# Patient Record
Sex: MALE | Race: WHITE | NOT HISPANIC OR LATINO | Employment: FULL TIME | URBAN - NONMETROPOLITAN AREA
[De-identification: names, ages, dates, MRNs, and addresses within clinical notes are randomized per-mention and may not be internally consistent; named-entity substitution may affect disease eponyms.]

---

## 2018-05-15 ENCOUNTER — LAB (OUTPATIENT)
Dept: LAB | Facility: HOSPITAL | Age: 28
End: 2018-05-15

## 2018-05-15 ENCOUNTER — OFFICE VISIT (OUTPATIENT)
Dept: FAMILY MEDICINE CLINIC | Facility: CLINIC | Age: 28
End: 2018-05-15

## 2018-05-15 VITALS
WEIGHT: 185 LBS | HEIGHT: 70 IN | HEART RATE: 79 BPM | OXYGEN SATURATION: 98 % | BODY MASS INDEX: 26.48 KG/M2 | SYSTOLIC BLOOD PRESSURE: 110 MMHG | DIASTOLIC BLOOD PRESSURE: 78 MMHG

## 2018-05-15 DIAGNOSIS — Z11.3 ROUTINE SCREENING FOR STI (SEXUALLY TRANSMITTED INFECTION): ICD-10-CM

## 2018-05-15 DIAGNOSIS — Z13.1 SCREENING FOR DIABETES MELLITUS (DM): ICD-10-CM

## 2018-05-15 DIAGNOSIS — Z76.89 ENCOUNTER TO ESTABLISH CARE WITH NEW DOCTOR: Primary | ICD-10-CM

## 2018-05-15 DIAGNOSIS — Z13.220 SCREENING FOR CHOLESTEROL LEVEL: ICD-10-CM

## 2018-05-15 LAB
ARTICHOKE IGE QN: 82 MG/DL (ref 1–129)
BILIRUB UR QL STRIP: NEGATIVE
CHOLEST SERPL-MCNC: 169 MG/DL (ref 0–199)
CLARITY UR: CLEAR
COLOR UR: YELLOW
GLUCOSE UR STRIP-MCNC: NEGATIVE MG/DL
HBA1C MFR BLD: 4.6 % (ref 4–5.6)
HDLC SERPL-MCNC: 51 MG/DL (ref 60–200)
HGB UR QL STRIP.AUTO: NEGATIVE
KETONES UR QL STRIP: NEGATIVE
LDLC/HDLC SERPL: 1.08 {RATIO} (ref 0–3.55)
LEUKOCYTE ESTERASE UR QL STRIP.AUTO: NEGATIVE
NITRITE UR QL STRIP: NEGATIVE
PH UR STRIP.AUTO: 7 [PH] (ref 5–9)
PROT UR QL STRIP: NEGATIVE
SP GR UR STRIP: 1.02 (ref 1–1.03)
TRIGL SERPL-MCNC: 315 MG/DL (ref 20–199)
UROBILINOGEN UR QL STRIP: NORMAL

## 2018-05-15 PROCEDURE — 81003 URINALYSIS AUTO W/O SCOPE: CPT

## 2018-05-15 PROCEDURE — G0432 EIA HIV-1/HIV-2 SCREEN: HCPCS

## 2018-05-15 PROCEDURE — 80061 LIPID PANEL: CPT

## 2018-05-15 PROCEDURE — 99203 OFFICE O/P NEW LOW 30 MIN: CPT | Performed by: FAMILY MEDICINE

## 2018-05-15 PROCEDURE — 83036 HEMOGLOBIN GLYCOSYLATED A1C: CPT

## 2018-05-15 PROCEDURE — 36415 COLL VENOUS BLD VENIPUNCTURE: CPT

## 2018-05-15 PROCEDURE — 86592 SYPHILIS TEST NON-TREP QUAL: CPT

## 2018-05-15 PROCEDURE — 87491 CHLMYD TRACH DNA AMP PROBE: CPT

## 2018-05-15 PROCEDURE — 87591 N.GONORRHOEAE DNA AMP PROB: CPT

## 2018-05-15 PROCEDURE — 87661 TRICHOMONAS VAGINALIS AMPLIF: CPT

## 2018-05-15 RX ORDER — TRIAMCINOLONE ACETONIDE 55 UG/1
2 SPRAY, METERED NASAL DAILY
COMMUNITY

## 2018-05-16 LAB
C TRACH RRNA CVX QL NAA+PROBE: NEGATIVE
HIV1+2 AB SER QL: NEGATIVE
N GONORRHOEA RRNA SPEC QL NAA+PROBE: NEGATIVE
T VAGINALIS DNA VAG QL PROBE+SIG AMP: NORMAL

## 2018-05-17 NOTE — PROGRESS NOTES
Subjective:     Chief Complaint:   Chief Complaint   Patient presents with   • [facility]       Barak Velasquez is a 27 y.o. male who presents for [facility] visit. He is establishing today for preventative services. He states that overall, he is in a good state of health, and currently reports no health issues. He states that he does have allergic rhinitis at times, which he does take OTC medications to help with symptoms. He is wanting to be screened for STIs. He states that he has been sexually active with two people, and states that he has used protection most of the time but there was times where he did not use protection, and wanted to make sure he was clean. In the past several weeks, he denies any penile discharge, scrotal pain, scrotal swelling. No fevers, chills, no enlargement of inguinal lymph nodes. No burning with urination.     Past Medical Hx:  No past medical history on file.    Past Surgical Hx:  No past surgical history on file.    Health Maintenance:  Health Maintenance   Topic Date Due   • ANNUAL PHYSICAL  05/29/1991   • TDAP/TD VACCINES (1 - Tdap) 05/29/2007   • INFLUENZA VACCINE  08/01/2018       Current Meds:    Current Outpatient Prescriptions:   •  Triamcinolone Acetonide (NASACORT) 55 MCG/ACT nasal inhaler, 2 sprays into each nostril Daily., Disp: , Rfl:     Allergies:  Patient has no known allergies.    Family Hx:  No family history on file.     Social History:  Social History     Social History   • Marital status: Single     Spouse name: N/A   • Number of children: N/A   • Years of education: N/A     Occupational History   • Not on file.     Social History Main Topics   • Smoking status: Never Smoker   • Smokeless tobacco: Never Used   • Alcohol use Not on file   • Drug use: Unknown   • Sexual activity: Not on file     Other Topics Concern   • Not on file     Social History Narrative   • No narrative on file       Review of Systems  Review of Systems   Constitutional:  "Negative for appetite change, chills and fever.   HENT: Negative for congestion, ear pain, rhinorrhea, sneezing and sore throat.    Respiratory: Negative for cough and shortness of breath.    Cardiovascular: Negative for chest pain, palpitations and leg swelling.   Gastrointestinal: Negative for diarrhea, nausea and vomiting.   Endocrine: Negative for polyphagia and polyuria.   Musculoskeletal: Negative for back pain and neck pain.   Skin: Negative for color change and rash.   Neurological: Negative for dizziness, syncope and weakness.   Psychiatric/Behavioral: Negative for agitation, confusion and dysphoric mood.       Objective:     /78 (BP Location: Right arm, Cuff Size: Adult)   Pulse 79   Ht 177.8 cm (70\")   Wt 83.9 kg (185 lb)   SpO2 98%   BMI 26.54 kg/m²     Physical Exam   Constitutional: He is oriented to person, place, and time. He appears well-developed and well-nourished.   Cardiovascular: Normal rate, regular rhythm and normal heart sounds.  Exam reveals no gallop and no friction rub.    No murmur heard.  Pulmonary/Chest: Effort normal and breath sounds normal. No respiratory distress. He has no wheezes. He has no rales.   Abdominal: Soft. Bowel sounds are normal. There is no tenderness.   Musculoskeletal: Normal range of motion. He exhibits no edema.   Neurological: He is alert and oriented to person, place, and time.   Skin: Skin is warm and dry.   Psychiatric: He has a normal mood and affect. His behavior is normal.   Vitals reviewed.         Assessment/Plan:     Barak was seen today for establish care.    Diagnoses and all orders for this visit:    Encounter to establish care with new doctor    Routine screening for STI (sexually transmitted infection)  -     HIV-1/O/2 Ag/Ab w Reflex; Future  -     RPR; Future  -     Urinalysis With / Culture If Indicated - Urine, Clean Catch; Future  -     Chlamydia trachomatis, Neisseria gonorrhoeae, PCR - Urine, Urine, Clean Catch; Future    Screening " for diabetes mellitus (DM)  -     Hemoglobin A1c; Future    Screening for cholesterol level  -     Lipid panel; Future        Return in about 1 year (around 5/15/2019) for Annual.    GOALS:  Improve diet and exercise   BARRIERS TO GOALS:  Work may interfere with goals.      Preventative:  Male Preventative: Exercises regularly  up to date and documented   Recommended:none  Tobacco: non smoker  Alcohol: does not drink  Diet/Exercise: eat more fruits and vegetables, decrease soda or juice intake, increase water intake, increase physical activity, reduce screen time, reduce portion size, cut out extra servings, reduce fast food intake, family to eat at dinner table more often, keep TV off during meals, plan meals, eat breakfast and have 3 meals a day    RISK SCORE: 3      This document has been electronically signed by Michael Velazquez MD on May 17, 2018 1:58 PM

## 2018-05-18 LAB — RPR SER QL: NORMAL

## 2019-05-07 ENCOUNTER — OFFICE VISIT (OUTPATIENT)
Dept: FAMILY MEDICINE CLINIC | Facility: CLINIC | Age: 29
End: 2019-05-07

## 2019-05-07 VITALS
WEIGHT: 198 LBS | HEIGHT: 70 IN | SYSTOLIC BLOOD PRESSURE: 128 MMHG | BODY MASS INDEX: 28.35 KG/M2 | HEART RATE: 93 BPM | OXYGEN SATURATION: 98 % | DIASTOLIC BLOOD PRESSURE: 80 MMHG

## 2019-05-07 DIAGNOSIS — Z00.00 ANNUAL PHYSICAL EXAM: Primary | ICD-10-CM

## 2019-05-07 PROCEDURE — 99395 PREV VISIT EST AGE 18-39: CPT | Performed by: FAMILY MEDICINE

## 2019-05-07 RX ORDER — FEXOFENADINE HCL AND PSEUDOEPHEDRINE HCI 180; 240 MG/1; MG/1
1 TABLET, EXTENDED RELEASE ORAL DAILY
COMMUNITY

## 2019-05-07 NOTE — PROGRESS NOTES
Subjective:     Chief Complaint:   Chief Complaint   Patient presents with   • Annual Exam       Barak Velasquez is a 28 y.o. male who presents for annual physical examination. He reports that he has been in a good state of health in the past years. Has not had any hospitalizations in the past year. He has had a biometric screening with his job which had normal labs. He reports that he had an elevated blood pressure reading at that time. He states that he was undergoing some stress at that time which he attributes could be a factor which brought his BP up. His blood pressure was checked in office today which resulted normal for his age range. He has a history of seasonal allergies, which he uses a nasal spray to help with symptoms. No acute issues currently with patient.     Past Medical Hx:  No past medical history on file.    Past Surgical Hx:  No past surgical history on file.    Health Maintenance:  Health Maintenance   Topic Date Due   • ANNUAL PHYSICAL  05/29/1991   • TDAP/TD VACCINES (1 - Tdap) 05/29/2007   • INFLUENZA VACCINE  08/01/2019       Current Meds:    Current Outpatient Medications:   •  fexofenadine-pseudoephedrine (ALLEGRA-D 24) 180-240 MG per 24 hr tablet, Take 1 tablet by mouth Daily., Disp: , Rfl:   •  Triamcinolone Acetonide (NASACORT) 55 MCG/ACT nasal inhaler, 2 sprays into each nostril Daily., Disp: , Rfl:     Allergies:  Patient has no known allergies.    Family Hx:  Family History   Problem Relation Age of Onset   • No Known Problems Mother    • No Known Problems Father         Social History:  Social History     Socioeconomic History   • Marital status: Single     Spouse name: Not on file   • Number of children: Not on file   • Years of education: Not on file   • Highest education level: Not on file   Tobacco Use   • Smoking status: Never Smoker   • Smokeless tobacco: Never Used       Review of Systems  Review of Systems   Constitutional: Negative for chills and fever.  "  Respiratory: Negative for shortness of breath.    Cardiovascular: Negative for chest pain.   Gastrointestinal: Negative for abdominal pain, diarrhea, nausea and vomiting.   Genitourinary: Negative for dysuria.   Neurological: Negative for dizziness.       Objective:     /80 (BP Location: Left arm)   Pulse 93   Ht 177.8 cm (70\")   Wt 89.8 kg (198 lb)   SpO2 98%   BMI 28.41 kg/m²     Physical Exam   Constitutional: He is oriented to person, place, and time. He appears well-developed and well-nourished.   HENT:   Head: Normocephalic and atraumatic.   Right Ear: External ear normal.   Left Ear: External ear normal.   Nose: Nose normal.   Mouth/Throat: Oropharynx is clear and moist.   Cardiovascular: Normal rate, regular rhythm and normal heart sounds. Exam reveals no gallop and no friction rub.   No murmur heard.  Pulmonary/Chest: Effort normal and breath sounds normal. No respiratory distress. He has no wheezes. He has no rales.   Abdominal: Soft. Bowel sounds are normal. There is no tenderness.   Musculoskeletal: Normal range of motion. He exhibits no edema.   Neurological: He is alert and oriented to person, place, and time.   Skin: Skin is warm and dry.   Psychiatric: He has a normal mood and affect. His behavior is normal.   Vitals reviewed.         Assessment/Plan:     Barak was seen today for annual exam.    Diagnoses and all orders for this visit:    Annual physical exam        Return in about 1 year (around 5/7/2020) for Annual.    GOALS:  Improve diet and exercise   BARRIERS TO GOALS:  Work may interfere with goals.      Preventative:  Male Preventative: Exercises regularly  up to date and documented   Recommended:none  Tobacco: non smoker  Alcohol: does not drink  Diet/Exercise: eat more fruits and vegetables, decrease soda or juice intake, increase water intake, increase physical activity, reduce screen time, reduce portion size, cut out extra servings, reduce fast food intake, family to eat at " dinner table more often, keep TV off during meals, plan meals, eat breakfast and have 3 meals a day    RISK SCORE: 3      This document has been electronically signed by Michael Velazquez MD on May 7, 2019 3:09 PM

## 2020-04-06 ENCOUNTER — TELEPHONE (OUTPATIENT)
Dept: ORTHOPEDIC SURGERY | Facility: CLINIC | Age: 30
End: 2020-04-06

## 2020-04-06 NOTE — TELEPHONE ENCOUNTER
YANG      Patient was seen at [facility] on Sunday April 5th. He was referred to us by Dr Oneill but we have not received the referral yet. He was told that on his lower right tibia there looked to be a hook he was told that was a fracture and to wear an ankle boot and follow up with you. Would you like to set up an appointment with this patient in office?

## 2020-04-06 NOTE — TELEPHONE ENCOUNTER
Ankle motion as tolerated  Ice and elevation  Use crutches for support  Set up for a video visit on Monday13th.

## 2020-04-10 ENCOUNTER — TRANSCRIBE ORDERS (OUTPATIENT)
Dept: ORTHOPEDIC SURGERY | Facility: CLINIC | Age: 30
End: 2020-04-10

## 2020-04-10 DIAGNOSIS — S82.891A CLOSED RIGHT ANKLE FRACTURE, INITIAL ENCOUNTER: Primary | ICD-10-CM

## 2020-04-13 ENCOUNTER — TELEMEDICINE (OUTPATIENT)
Dept: ORTHOPEDIC SURGERY | Facility: CLINIC | Age: 30
End: 2020-04-13
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
## 2020-04-13 DIAGNOSIS — S93.421A SPRAIN OF DELTOID LIGAMENT OF RIGHT ANKLE, INITIAL ENCOUNTER: Primary | ICD-10-CM
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
## 2020-04-13 PROCEDURE — 99203 OFFICE O/P NEW LOW 30 MIN: CPT | Performed by: ORTHOPAEDIC SURGERY

## 2020-04-13 RX ORDER — FEXOFENADINE HCL AND PSEUDOEPHEDRINE HCI 60; 120 MG/1; MG/1
1 TABLET, EXTENDED RELEASE ORAL
COMMUNITY

## 2020-04-13 RX ORDER — FLUTICASONE PROPIONATE 50 MCG
1 SPRAY, SUSPENSION (ML) NASAL DAILY
COMMUNITY

## 2020-04-13 NOTE — PROGRESS NOTES
Barak Velasquez is a 29 y.o. male   Primary provider:  Michael Velazquez MD       Chief Complaint   Patient presents with   • Ankle Pain     right       HISTORY OF PRESENT ILLNESS:  Patient is a 29-year-old white male who states that approximately 1 week ago he fell in his garage twisting his right ankle.  He had immediate pain in his right ankle and was seen at an urgent care for x-rays.  He was found to have a small chip fracture off of the medial malleolus and was placed into a walking boot.  Patient states that his pain has improved over the last week.  He continues to use the boot when he is out of the house and uses the boot some while he is in the house.  He has been walking some without the boot however.  He has mostly a dull ache but he has occasional sharp stabbing pains.  No numbness or tingling.  No other bony complaints.         CONCURRENT MEDICAL HISTORY:    History reviewed. No pertinent past medical history.    No Known Allergies      Current Outpatient Medications:   •  fexofenadine-pseudoephedrine (ALLEGRA-D 24) 180-240 MG per 24 hr tablet, Take 1 tablet by mouth Daily., Disp: , Rfl:   •  fexofenadine-pseudoephedrine (ALLEGRA-D)  MG per 12 hr tablet, Take 1 tablet by mouth., Disp: , Rfl:   •  fluticasone (FLONASE) 50 MCG/ACT nasal spray, 1 spray into the nostril(s) as directed by provider Daily., Disp: , Rfl:   •  Triamcinolone Acetonide (NASACORT) 55 MCG/ACT nasal inhaler, 2 sprays into each nostril Daily., Disp: , Rfl:     History reviewed. No pertinent surgical history.    Family History   Problem Relation Age of Onset   • No Known Problems Mother    • No Known Problems Father        Social History     Socioeconomic History   • Marital status: Single     Spouse name: Not on file   • Number of children: Not on file   • Years of education: Not on file   • Highest education level: Not on file   Tobacco Use   • Smoking status: Never Smoker   • Smokeless tobacco: Never Used         Review of Systems   Constitutional: Negative for chills and fever.   Respiratory: Negative.    Cardiovascular: Negative.    Gastrointestinal: Negative.    Genitourinary: Negative.    Musculoskeletal:        Right ankle pain   All other systems reviewed and are negative.      PHYSICAL EXAMINATION:       There were no vitals taken for this visit. - done as a video visit    Physical Exam   Constitutional: He is oriented to person, place, and time. He appears well-developed and well-nourished.   Eyes: Pupils are equal, round, and reactive to light. Conjunctivae are normal.   Neurological: He is alert and oriented to person, place, and time.   Psychiatric: He has a normal mood and affect. His behavior is normal. Judgment and thought content normal.       GAIT:     []  Normal  [x]  Antalgic    Assistive device: []  None  []  Walker     [x]  Crutches  []  Cane     []  Wheelchair  []  Stretcher    Ortho Exam  Mild swelling in right ankle  He is able to wiggle his foot up and down somewhat.          X-ray ankle right complete (3+views)4/6/2020  [facility]  Result Impression      Avulsion fracture tip medial malleolus with joint effusion.    [id number]   Result Narrative   Indication:  Injured right ankle.    Right Ankle, Three Views:  The ankle is normally aligned.  The tip of the medial malleolus has been avulsed; there is very little swelling appreciated and the age of this is uncertain.  There is a smooth calcification projecting along the medial aspect   of the lateral malleolus that is probably an accessory ossicle.  The talar dome is smooth.  There is a joint effusion.           ASSESSMENT:    Diagnoses and all orders for this visit:    Sprain of deltoid ligament of right ankle, initial encounter    Fall at home, initial encounter    Other orders  -     fluticasone (FLONASE) 50 MCG/ACT nasal spray; 1 spray into the nostril(s) as directed by provider Daily.  -     fexofenadine-pseudoephedrine (ALLEGRA-D)   MG per 12 hr tablet; Take 1 tablet by mouth.          PLAN    Discussion was held with the patient in regards to further treatment options.  He has a small a avulsion off of the tip of the medial malleolus that is nondisplaced.  We discussed that essentially this represents a significant ankle sprain.  He is going to begin ankle range of motion exercises.  He will slowly progress range of motion as tolerated.  He is also going to start progressing weightbearing as pain allows.  He was encouraged to use the boot and slowly increase weight.  As his pain improves he could start to wean himself out of the boot and protected and isolated situations.  If he had pain he was to decrease weightbearing and or go back to using the boot for support.  He was going to continue with ice and elevation.  We discussed repeat evaluation in 2 to 3 weeks.  As long as he was improving we would revisit the telehealth option.  If his symptoms were worsening then he was going to convert that visit to an in person visit for repeat x-rays and repeat evaluation of his ankle.    This visit was performed using telehealth video visit.    Return in about 2 weeks (around 4/27/2020) for recheck.    Charles Brewer MD

## 2020-05-20 DIAGNOSIS — M25.571 RIGHT ANKLE PAIN, UNSPECIFIED CHRONICITY: Primary | ICD-10-CM

## 2020-05-21 ENCOUNTER — OFFICE VISIT (OUTPATIENT)
Dept: ORTHOPEDIC SURGERY | Facility: CLINIC | Age: 30
End: 2020-05-21

## 2020-05-21 VITALS — WEIGHT: 206 LBS | HEIGHT: 70 IN | BODY MASS INDEX: 29.49 KG/M2

## 2020-05-21 DIAGNOSIS — S93.421D SPRAIN OF DELTOID LIGAMENT OF RIGHT ANKLE, SUBSEQUENT ENCOUNTER: ICD-10-CM

## 2020-05-21 DIAGNOSIS — M25.571 RIGHT ANKLE PAIN, UNSPECIFIED CHRONICITY: Primary | ICD-10-CM

## 2020-05-21 PROBLEM — S93.421A SPRAIN OF DELTOID LIGAMENT OF RIGHT ANKLE: Status: ACTIVE | Noted: 2020-05-21

## 2020-05-21 PROCEDURE — 99213 OFFICE O/P EST LOW 20 MIN: CPT | Performed by: ORTHOPAEDIC SURGERY

## 2020-05-21 NOTE — PROGRESS NOTES
"Barak Velasquez is a 29 y.o. male returns for     Chief Complaint   Patient presents with   • Right Ankle - Follow-up, Pain       HISTORY OF PRESENT ILLNESS: f/u right ankle pain with repeat xrays.   Not wearing the fracture boot now, using high ankle boots.  No numbness or tingling  No fever or chills.  Slowly getting better but still with some stiffness.     CONCURRENT MEDICAL HISTORY:    The following portions of the patient's history were reviewed and updated as appropriate: allergies, current medications, past family history, past medical history, past social history, past surgical history and problem list.     ROS  No fevers or chills.  No chest pain or shortness of air.  No GI or  disturbances.    PHYSICAL EXAMINATION:       Ht 177.8 cm (70\")   Wt 93.4 kg (206 lb)   BMI 29.56 kg/m²     Physical Exam   Constitutional: He is oriented to person, place, and time. He appears well-developed and well-nourished.   Neurological: He is alert and oriented to person, place, and time.   Psychiatric: He has a normal mood and affect. His behavior is normal. Judgment and thought content normal.       GAIT:     []  Normal  [x]  Antalgic    Assistive device: [x]  None  []  Walker     []  Crutches  []  Cane     []  Wheelchair  []  Stretcher    Right Ankle Exam     Tenderness   The patient is experiencing tenderness in the ATF (mild tenderness over medial malleolus).  Swelling: none    Range of Motion   Dorsiflexion: normal   Plantar flexion: normal     Muscle Strength   Dorsiflexion:  5/5  Plantar flexion:  5/5    Tests   Anterior drawer: negative    Other   Erythema: absent  Sensation: normal  Pulse: present               Xr Ankle 3+ View Right    Result Date: 5/21/2020  Narrative: Ordering Provider:  Charles Brewer MD Ordering Diagnosis/Indication:  Right ankle pain, unspecified chronicity Procedure:  XR ANKLE 3+ VW RIGHT Exam Date:  5/21/20 COMPARISON:  Not applicable, no relevant images available. "     Impression:  3 views of the right ankle show acceptable position and alignment with no evidence of acute bony abnormality.  No fracture or dislocation is noted.  Ankle mortise maintains acceptable alignment on each view.  There is a question of a very small a avulsion on the tip of the medial malleolus that shows no displacement. Charles Brewer MD 5/21/20             ASSESSMENT:    Diagnoses and all orders for this visit:    Right ankle pain, unspecified chronicity  -     Ambulatory Referral to Physical Therapy Evaluate and treat    Sprain of deltoid ligament of right ankle, subsequent encounter  -     Ambulatory Referral to Physical Therapy Evaluate and treat          PLAN    PT   ROM/STR   Ankle stability exercises   Advance as tolerated   ASO if needed.   Modalities as needed.    Slowly progress as tolerated.    No restrictions.    F/u 1 month.    Return in about 4 weeks (around 6/18/2020) for recheck.    Charles Brewer MD

## 2020-06-25 ENCOUNTER — HOSPITAL ENCOUNTER (OUTPATIENT)
Dept: PHYSICAL THERAPY | Facility: HOSPITAL | Age: 30
Setting detail: THERAPIES SERIES
Discharge: HOME | End: 2020-06-25

## 2020-06-25 DIAGNOSIS — S93.421D SPRAIN OF DELTOID LIGAMENT OF RIGHT ANKLE, SUBSEQUENT ENCOUNTER: ICD-10-CM

## 2020-06-25 DIAGNOSIS — M25.571 RIGHT ANKLE PAIN, UNSPECIFIED CHRONICITY: Primary | ICD-10-CM

## 2020-06-25 PROCEDURE — 97161 PT EVAL LOW COMPLEX 20 MIN: CPT | Performed by: PHYSICAL THERAPIST

## 2020-06-25 NOTE — THERAPY EVALUATION
Outpatient Physical Therapy Ortho Initial Evaluation  [facility]     Patient Name: Barak Velasquez  : 1990  MRN: [id number]  Today's Date: 2020      Visit Date: 2020  Visit   Return to MD: BROOKE  Re-cert date: 2020  Patient Active Problem List   Diagnosis   • Right ankle pain   • Sprain of deltoid ligament of right ankle        Past Medical History:   Diagnosis Date   • Asthma         Past Surgical History:   Procedure Laterality Date   • KNEE ACL RECONSTRUCTION Left        Visit Dx:     ICD-10-CM ICD-9-CM   1. Right ankle pain, unspecified chronicity M25.571 719.47   2. Sprain of deltoid ligament of right ankle, subsequent encounter S93.421D V58.89     845.01     Medications (Admitted on 2020)     fexofenadine-pseudoephedrine (ALLEGRA-D 24) 180-240 MG per 24 hr tablet     fexofenadine-pseudoephedrine (ALLEGRA-D)  MG per 12 hr tablet     fluticasone (FLONASE) 50 MCG/ACT nasal spray     Triamcinolone Acetonide (NASACORT) 55 MCG/ACT nasal inhaler      Allergies: NKA        PT Ortho     Row Name 20 1305       Subjective Comments    Subjective Comments  31 yo male with acute ankle injury on 2020 at home stepping into garage and fell onto the floor, rolling the ankle at the time. R ankle went numb at the time. X-ray the following morning showing an avulsion fracture of the R medial malleolus. Aggravating factors: active R ankle DF, stepping down stairs, hopping, jogging,    -BS       Precautions and Contraindications    Precautions/Limitations  no known precautions/limitations  -BS       Subjective Pain    Able to rate subjective pain?  yes  -BS    Pre-Treatment Pain Level  1  -BS    Post-Treatment Pain Level  0  -BS       Posture/Observations    Posture/Observations Comments  functional testing: deep squat with minimal R ankle pain, B heel raises x 10 reps with only slight R ankle discomfort.   -BS       Special Tests/Palpation    Special Tests/Palpation   "Ankle/Foot  -BS       Ankle/Foot Special Tests    Anterior drawer (ATFL lesion)  Right:;Negative  -BS    Talar tilt test (instability)  Right:;Negative  -BS       General ROM    GENERAL ROM COMMENTS  AROM: R ankle-DF 3 PF 50 inv 38 aileen 10 L ankle   -BS       MMT (Manual Muscle Testing)    General MMT Comments  MMT: R LE 5/5 including 5/5 R ankle (all planes); L LE 5/5  -BS       Sensation    Light Touch  No apparent deficits  -BS       Balance Skills Training    SLS  30 sec on each LE without UE A, increased ankle instabilty on RLE  -BS       Gait/Stairs Assessment/Training    Comment (Gait/Stairs)  up/down 2-3 portable steps with single UE A with slight lateral ankle (R) pain descending 6\" steps  -BS      User Key  (r) = Recorded By, (t) = Taken By, (c) = Cosigned By    Initials Name Provider Type    Nikita Alexis, PT Physical Therapist                            PT OP Goals     Row Name 06/25/20 1300          PT Short Term Goals    STG Date to Achieve  07/16/20  -BS     STG 1  Pt independent with HEP  -BS     STG 1 Progress  New  -BS     STG 2  Resume running on treadmill with no R ankle pain/symptoms  -BS     STG 2 Progress  New  -BS     STG 3  Descend stairs with no R ankle pain  -BS     STG 3 Progress  New  -BS     STG 4  Improve R ankle DF AROM to >/=6 degrees  -BS     STG 4 Progress  New  -BS        Time Calculation    PT Goal Re-Cert Due Date  07/16/20  -BS       User Key  (r) = Recorded By, (t) = Taken By, (c) = Cosigned By    Initials Name Provider Type    Nikita Alexis, PT Physical Therapist          PT Assessment/Plan     Row Name 06/25/20 1300          PT Assessment    Functional Limitations  Performance in work activities;Performance in leisure activities  -BS     Impairments  Balance;Pain;Range of motion  -BS     Assessment Comments  Acute R ankle pain due to deltoid sprain  -BS     Please refer to paper survey for additional self-reported information  Yes  -BS     Rehab Potential  Excellent  " -BS     Patient/caregiver participated in establishment of treatment plan and goals  Yes  -BS     Patient would benefit from skilled therapy intervention  Yes  -BS        PT Plan    PT Frequency  1x/week;2x/week  -BS     Predicted Duration of Therapy Intervention (Therapy Eval)  3-4 sessions total  -BS     Planned CPT's?  PT EVAL LOW COMPLEXITY: 37079  -BS     Physical Therapy Interventions (Optional Details)  balance training;home exercise program;joint mobilization;manual therapy techniques;modalities;neuromuscular re-education;ROM (Range of Motion);stair training;strengthening;stretching  -BS     PT Plan Comments  f/u with ankle stabilization exercises, attempt light jogging on treadmill.  -BS       User Key  (r) = Recorded By, (t) = Taken By, (c) = Cosigned By    Initials Name Provider Type    Nikita Alexis, PT Physical Therapist            OP Exercises     Row Name 06/25/20 1307             Subjective Comments    Subjective Comments  31 yo male with acute ankle injury on 4/5/2020 at home stepping into garage and fell onto the floor, rolling the ankle at the time. R ankle went numb at the time. X-ray the following morning showing an avulsion fracture of the R medial malleolus. Aggravating factors: active R ankle DF, stepping down stairs, hopping, jogging,    -BS         Subjective Pain    Able to rate subjective pain?  yes  -BS      Pre-Treatment Pain Level  1  -BS      Post-Treatment Pain Level  0  -BS        User Key  (r) = Recorded By, (t) = Taken By, (c) = Cosigned By    Initials Name Provider Type    Nikita Alexis, PT Physical Therapist                        Outcome Measure Options: Lower Extremity Functional Scale (LEFS)  Lower Extremity Functional Index  Any of your usual work, housework or school activities: A little bit of difficulty  Your usual hobbies, recreational or sporting activities: Moderate difficulty  Getting into or out of the bath: No difficulty  Walking between rooms: No  difficulty  Putting on your shoes or socks: A little bit of difficulty  Squatting: A little bit of difficulty  Lifting an object, like a bag of groceries from the floor: No difficulty  Performing light activities around your home: No difficulty  Performing heavy activities around your home: A little bit of difficulty  Getting into or out of a car: No difficulty  Walking 2 blocks: A little bit of difficulty  Walking a mile: A little bit of difficulty  Going up or down 10 stairs (about 1 flight of stairs): A little bit of difficulty  Standing for 1 hour: A little bit of difficulty  Sitting for 1 hour: No difficulty  Running on even ground: A little bit of difficulty  Running on uneven ground: A little bit of difficulty  Making sharp turns while running fast: A little bit of difficulty  Hopping: A little bit of difficulty  Rolling over in bed: No difficulty  Total: 66      Time Calculation:     Start Time: 1305  Stop Time: 1345  Time Calculation (min): 40 min  Total Timed Code Minutes- PT: 40 minute(s)     Therapy Charges for Today     Code Description Service Date Service Provider Modifiers Qty    67714301385 HC PT EVAL LOW COMPLEXITY 3 6/25/2020 Nikita Desai, PT GP 1          PT G-Codes  Outcome Measure Options: Lower Extremity Functional Scale (LEFS)  Total: 66         Nikita Desai PT  6/25/2020

## 2020-07-14 ENCOUNTER — APPOINTMENT (OUTPATIENT)
Dept: PHYSICAL THERAPY | Facility: HOSPITAL | Age: 30
End: 2020-07-14

## 2020-07-16 ENCOUNTER — HOSPITAL ENCOUNTER (OUTPATIENT)
Dept: PHYSICAL THERAPY | Facility: HOSPITAL | Age: 30
Setting detail: THERAPIES SERIES
Discharge: HOME | End: 2020-07-16

## 2020-07-16 DIAGNOSIS — S93.421D SPRAIN OF DELTOID LIGAMENT OF RIGHT ANKLE, SUBSEQUENT ENCOUNTER: ICD-10-CM

## 2020-07-16 DIAGNOSIS — M25.571 RIGHT ANKLE PAIN, UNSPECIFIED CHRONICITY: Primary | ICD-10-CM

## 2020-07-16 PROCEDURE — 97110 THERAPEUTIC EXERCISES: CPT

## 2020-07-16 NOTE — THERAPY TREATMENT NOTE
"    Outpatient Physical Therapy Ortho Treatment Note  [facility]     Patient Name: Barak Velasquez  : 1990  MRN: [id number]  Today's Date: 2020      Visit Date: 2020     Subjective Improvement \"getting better\"  Visis 2/3  Visits approved 5  RTMD PRN  Recert Date 2020    R ankle sprain with avulsion fx on 2020    Visit Dx:    ICD-10-CM ICD-9-CM   1. Right ankle pain, unspecified chronicity M25.571 719.47   2. Sprain of deltoid ligament of right ankle, subsequent encounter S93.421D V58.89     845.01       Patient Active Problem List   Diagnosis   • Right ankle pain   • Sprain of deltoid ligament of right ankle        Past Medical History:   Diagnosis Date   • Asthma         Past Surgical History:   Procedure Laterality Date   • KNEE ACL RECONSTRUCTION Left        PT Ortho     Row Name 20 0800       Subjective Comments    Subjective Comments  patient reports right ankle pain with pushing off and activities that cause him to flex foot forward.  -CP       Precautions and Contraindications    Precautions  R ankle avulsion fx on 2020  -CP       Subjective Pain    Able to rate subjective pain?  yes  -CP    Pre-Treatment Pain Level  0  -CP    Post-Treatment Pain Level  1  -CP       Posture/Observations    Posture/Observations Comments  amb independent  -CP      User Key  (r) = Recorded By, (t) = Taken By, (c) = Cosigned By    Initials Name Provider Type    CP Dori Bo, PTA Physical Therapy Assistant                      PT Assessment/Plan     Row Name 20 1029          PT Assessment    Assessment Comments  Patient gave good effort.  He did have increase pain and weakness with attempted quick feet.  did have some LOB with BOSU.    -CP        PT Plan    PT Frequency  1x/week;2x/week  -CP     Predicted Duration of Therapy Intervention (Therapy Eval)  3-4 visits  -CP     PT Plan Comments  Cont with POC.  Step down 4\"  -CP       User Key  (r) = Recorded By, (t) = " Taken By, (c) = Cosigned By    Initials Name Provider Type    CP Dori Bo, PTA Physical Therapy Assistant            OP Exercises     Row Name 07/16/20 0800             Subjective Comments    Subjective Comments  patient reports right ankle pain with pushing off and activities that cause him to flex foot forward.  -CP         Subjective Pain    Able to rate subjective pain?  yes  -CP      Pre-Treatment Pain Level  0  -CP      Post-Treatment Pain Level  1  -CP         Exercise 1    Exercise Name 1  ProII level 3  -CP      Time 1  10  -CP         Exercise 2    Exercise Name 2  incline stretch  -CP      Cueing 2  Verbal  -CP      Sets 2  3  -CP      Time 2  30  -CP         Exercise 3    Exercise Name 3  Solous stretch  -CP      Cueing 3  Verbal  -CP      Sets 3  3  -CP      Time 3  30  -CP         Exercise 4    Exercise Name 4  up and down ramp in clinic  -CP      Cueing 4  Verbal  -CP      Reps 4  5  -CP      Time 4  backward  -CP         Exercise 5    Exercise Name 5  BOSU step up  -CP      Cueing 5  Verbal;Demo  -CP      Sets 5  2  -CP      Reps 5  10  -CP         Exercise 6    Exercise Name 6  BOSU lat step up  -CP      Cueing 6  Verbal;Demo  -CP      Sets 6  2  -CP      Reps 6  10  -CP         Exercise 7    Exercise Name 7  Mini squats  -CP      Cueing 7  Verbal;Demo  -CP      Reps 7  15  -CP         Exercise 8    Exercise Name 8  Split stance heel raises  -CP      Cueing 8  Verbal;Demo  -CP      Reps 8  2  -CP      Time 8  10  -CP      Additional Comments  right LE behing=d  -CP         Exercise 9    Exercise Name 9  Toe walk  -CP      Cueing 9  Verbal;Demo  -CP      Reps 9  15 ft  -CP         Exercise 10    Exercise Name 10  heel walking  -CP      Cueing 10  Verbal;Demo  -CP      Reps 10  15  -CP         Exercise 11    Exercise Name 11  defensively slides  -CP      Cueing 11  Verbal;Demo  -CP      Reps 11  20 ft   -CP      Time 11  bilateral  -CP         Exercise 12    Exercise Name 12  braiding  -CP       Cueing 12  Verbal;Demo  -CP      Time 12  20 ft   -CP      Additional Comments  bilateral  -CP        User Key  (r) = Recorded By, (t) = Taken By, (c) = Cosigned By    Initials Name Provider Type    CP Dori Bo PTA Physical Therapy Assistant                       PT OP Goals     Row Name 07/16/20 1000          PT Short Term Goals    STG Date to Achieve  07/16/20  -CP     STG 1  Pt independent with HEP  -CP     STG 1 Progress  Ongoing  -CP     STG 2  Resume running on treadmill with no R ankle pain/symptoms  -CP     STG 2 Progress  Not Met  -CP     STG 3  Descend stairs with no R ankle pain  -CP     STG 3 Progress  Not Met  -CP     STG 4  Improve R ankle DF AROM to >/=6 degrees  -CP     STG 4 Progress  Progressing  -CP        Time Calculation    PT Goal Re-Cert Due Date  07/16/20  -CP       User Key  (r) = Recorded By, (t) = Taken By, (c) = Cosigned By    Initials Name Provider Type    CP Dori Bo PTA Physical Therapy Assistant          Therapy Education  Education Details: mini squats, split stance heelraises, toe walking, heel walking  Given: HEP  Program: New  How Provided: Verbal, Demonstration  Provided to: Patient  Level of Understanding: Teach back education performed, Verbalized, Demonstrated              Time Calculation:   Start Time: 0848  Stop Time: 0930  Time Calculation (min): 42 min  Total Timed Code Minutes- PT: 42 minute(s)  Therapy Charges for Today     Code Description Service Date Service Provider Modifiers Qty    24290197131 HC PT THER PROC EA 15 MIN 7/16/2020 Dori Bo PTA GP 3                    Dori Bo PTA  7/16/2020

## 2020-07-20 ENCOUNTER — HOSPITAL ENCOUNTER (OUTPATIENT)
Dept: PHYSICAL THERAPY | Facility: HOSPITAL | Age: 30
Setting detail: THERAPIES SERIES
Discharge: HOME | End: 2020-07-20

## 2020-07-20 DIAGNOSIS — S93.421D SPRAIN OF DELTOID LIGAMENT OF RIGHT ANKLE, SUBSEQUENT ENCOUNTER: ICD-10-CM

## 2020-07-20 DIAGNOSIS — M25.571 RIGHT ANKLE PAIN, UNSPECIFIED CHRONICITY: Primary | ICD-10-CM

## 2020-07-20 PROCEDURE — 97112 NEUROMUSCULAR REEDUCATION: CPT | Performed by: PHYSICAL THERAPIST

## 2020-07-20 PROCEDURE — 97110 THERAPEUTIC EXERCISES: CPT | Performed by: PHYSICAL THERAPIST

## 2020-07-20 NOTE — THERAPY PROGRESS REPORT/RE-CERT
"    Outpatient Physical Therapy Ortho Progress Note  [facility]     Patient Name: Barak Velasquez  : 1990  MRN: [id number]  Today's Date: 2020      Visit Date: 2020  Subjective Improvement \"getting better\"  Visis 3/4  Visits approved 5  RTMD PRN  Recert Date 8-     R ankle sprain with avulsion fx on 2020    Visit Dx:    ICD-10-CM ICD-9-CM   1. Right ankle pain, unspecified chronicity M25.571 719.47   2. Sprain of deltoid ligament of right ankle, subsequent encounter S93.421D V58.89     845.01       Patient Active Problem List   Diagnosis   • Right ankle pain   • Sprain of deltoid ligament of right ankle        Past Medical History:   Diagnosis Date   • Asthma         Past Surgical History:   Procedure Laterality Date   • KNEE ACL RECONSTRUCTION Left        PT Ortho     Row Name 20 0803       Subjective Comments    Subjective Comments  pt reports slightly sore in his ankle this morning.   -BS       Precautions and Contraindications    Precautions/Limitations  no known precautions/limitations  -BS    Precautions  R ankle avulsion fx on 2020  -BS       Subjective Pain    Post-Treatment Pain Level  0  -BS       Balance Skills Training    SLS  30 sec on each LE, moderate ankle strategy employed with R LE  -BS    Balance Comments  moderate ankle instability noted with R LE, minimal to none with L LE  -BS      User Key  (r) = Recorded By, (t) = Taken By, (c) = Cosigned By    Initials Name Provider Type    Nikita Alexis PT Physical Therapist                      PT Assessment/Plan     Row Name 20 0800          PT Assessment    Assessment Comments  Met STG #4, progressing toward goals. Limiited by ankle stability with SLS type exercises and toe walking/heel raises.   -BS     Please refer to paper survey for additional self-reported information  Yes  -BS        PT Plan    PT Frequency  1x/week;2x/week  -BS     Predicted Duration of Therapy Intervention (Therapy " "Eval)   2-3 weeks (10 total)  -BS     PT Plan Comments  attempt 6\" step down.   -BS       User Key  (r) = Recorded By, (t) = Taken By, (c) = Cosigned By    Initials Name Provider Type    Nikita Alexis, PT Physical Therapist            OP Exercises     Row Name 07/20/20 0803             Subjective Comments    Subjective Comments  pt reports slightly sore in his ankle this morning.   -BS         Subjective Pain    Able to rate subjective pain?  yes  -BS      Pre-Treatment Pain Level  1  -BS      Post-Treatment Pain Level  0  -BS         Exercise 1    Exercise Name 1  Pro II level 5  -BS      Time 1  10  -BS         Exercise 2    Exercise Name 2  incline stretch  -BS      Sets 2  3  -BS      Time 2  30  -BS         Exercise 3    Exercise Name 3  Soleus stretch  -BS      Sets 3  3  -BS         Exercise 4    Exercise Name 4  unilateral heel raises, R only  -BS      Sets 4  1  -BS      Reps 4  10  -BS         Exercise 5    Exercise Name 5  BOSU step up/over  -BS      Sets 5  1  -BS      Reps 5  20  -BS      Additional Comments  leading with R LE  -BS         Exercise 6    Exercise Name 6  SLS on BOSU  -BS      Time 6  30\" on R  -BS         Exercise 7    Exercise Name 7  inverted ARIELLE stance  -BS      Time 7  30\"  -BS      Additional Comments  wide ARIELLE  -BS         Exercise 8    Exercise Name 8  seated heel to toe taps, bilat  -BS      Reps 8  1  -BS      Time 8  20 ea  -BS         Exercise 9    Exercise Name 9  Toe walk  -BS      Reps 9  2 laps  -BS         Exercise 10    Exercise Name 10  heel walking  -BS      Time 10  2 laps  -BS        User Key  (r) = Recorded By, (t) = Taken By, (c) = Cosigned By    Initials Name Provider Type    Nikita Alexis, PT Physical Therapist                       PT OP Goals     Row Name 07/20/20 0800          PT Short Term Goals    STG Date to Achieve  07/16/20  -BS     STG 1  Pt independent with HEP  -BS     STG 1 Progress  Ongoing  -BS     STG 2  Resume running on treadmill with no " R ankle pain/symptoms  -BS     STG 2 Progress  Not Met  -BS     STG 3  Descend stairs with no R ankle pain  -BS     STG 3 Progress  Progressing;Ongoing  -BS     STG 4  Improve R ankle DF AROM to >/=6 degrees  -BS     STG 4 Progress  Met  -BS     STG 5  SLS x 30 sec on R LE with good ankle stability  -BS     STG 5 Progress  New  -BS        Time Calculation    PT Goal Re-Cert Due Date  08/10/20  -BS       User Key  (r) = Recorded By, (t) = Taken By, (c) = Cosigned By    Initials Name Provider Type    Nikita Alexis, PT Physical Therapist          Therapy Education  Education Details: see flow sheet  Given: HEP  Program: New, Reinforced  How Provided: Verbal, Demonstration  Provided to: Patient  Level of Understanding: Teach back education performed    Outcome Measure Options: Lower Extremity Functional Scale (LEFS)  Lower Extremity Functional Index  Any of your usual work, housework or school activities: A little bit of difficulty  Your usual hobbies, recreational or sporting activities: A little bit of difficulty  Getting into or out of the bath: No difficulty  Walking between rooms: A little bit of difficulty  Putting on your shoes or socks: A little bit of difficulty  Squatting: A little bit of difficulty  Lifting an object, like a bag of groceries from the floor: No difficulty  Performing light activities around your home: A little bit of difficulty  Performing heavy activities around your home: A little bit of difficulty  Getting into or out of a car: No difficulty  Walking 2 blocks: A little bit of difficulty  Walking a mile: A little bit of difficulty  Going up or down 10 stairs (about 1 flight of stairs): A little bit of difficulty  Standing for 1 hour: A little bit of difficulty  Sitting for 1 hour: No difficulty  Running on even ground: A little bit of difficulty  Running on uneven ground: Moderate difficulty  Making sharp turns while running fast: Moderate difficulty  Hopping: A little bit of  difficulty  Rolling over in bed: No difficulty  Total: 63      Time Calculation:   Start Time: 0803  Stop Time: 0842  Time Calculation (min): 39 min  Total Timed Code Minutes- PT: 39 minute(s)  Therapy Charges for Today     Code Description Service Date Service Provider Modifiers Qty    23080520782 HC PT THER PROC EA 15 MIN 7/20/2020 Nikita Desai, PT GP 2    53693430261 HC PT NEUROMUSC RE EDUCATION EA 15 MIN 7/20/2020 Nikita Desai, PT GP 1          PT G-Codes  Outcome Measure Options: Lower Extremity Functional Scale (LEFS)  Total: 63         Nikita Desai, PT  7/20/2020

## 2020-07-23 ENCOUNTER — HOSPITAL ENCOUNTER (OUTPATIENT)
Dept: PHYSICAL THERAPY | Facility: HOSPITAL | Age: 30
Setting detail: THERAPIES SERIES
Discharge: HOME | End: 2020-07-23

## 2020-07-23 DIAGNOSIS — M25.571 RIGHT ANKLE PAIN, UNSPECIFIED CHRONICITY: Primary | ICD-10-CM

## 2020-07-23 DIAGNOSIS — S93.421D SPRAIN OF DELTOID LIGAMENT OF RIGHT ANKLE, SUBSEQUENT ENCOUNTER: ICD-10-CM

## 2020-07-23 PROCEDURE — 97110 THERAPEUTIC EXERCISES: CPT

## 2020-07-23 NOTE — THERAPY TREATMENT NOTE
Outpatient Physical Therapy Ortho Treatment Note  [facility]     Patient Name: Barak Velasquez  : 1990  MRN: [id number]  Today's Date: 2020      Visit Date: 2020     Subjective Improvement seeing improvement  Visits 4/5  Visits approved 5  RTMD 2020  Recert Date 8-    Right Ankle Sprain with Avulsion FX on 2020    Visit Dx:    ICD-10-CM ICD-9-CM   1. Right ankle pain, unspecified chronicity M25.571 719.47   2. Sprain of deltoid ligament of right ankle, subsequent encounter S93.421D V58.89     845.01       Patient Active Problem List   Diagnosis   • Right ankle pain   • Sprain of deltoid ligament of right ankle        Past Medical History:   Diagnosis Date   • Asthma         Past Surgical History:   Procedure Laterality Date   • KNEE ACL RECONSTRUCTION Left                        PT Assessment/Plan     Row Name 20 1010          PT Assessment    Assessment Comments  ankle weakness noted with ex perfomed in the Gym.  Attempted high kick skipping but patient unable to fully lift off with right LE.  -CP        PT Plan    PT Frequency  1x/week;2x/week  -CP     Predicted Duration of Therapy Intervention (Therapy Eval)  10 visits total  -CP     PT Plan Comments  Cont with POC.  will need to request additonal visits after next visit  -CP       User Key  (r) = Recorded By, (t) = Taken By, (c) = Cosigned By    Initials Name Provider Type    CP Dori Bo, PTA Physical Therapy Assistant            OP Exercises     Row Name 20 0800             Subjective Comments    Subjective Comments  Patient states that he was osre after last therapy visit but today reports no pain.  -CP         Subjective Pain    Able to rate subjective pain?  yes  -CP      Pre-Treatment Pain Level  0  -CP      Post-Treatment Pain Level  0  -CP      Subjective Pain Comment  just sore after therapy no real pain  -CP         Exercise 1    Exercise Name 1  Pro II level 5  -CP      Time 1  10   "-CP         Exercise 2    Exercise Name 2  incline stretch  -CP      Cueing 2  Verbal  -CP      Sets 2  3  -CP      Time 2  30  -CP         Exercise 3    Exercise Name 3  soleus stretch  -CP      Cueing 3  Verbal  -CP      Sets 3  3  -CP      Time 3  30  -CP         Exercise 4    Exercise Name 4  up and down ramp side stepping  -CP      Cueing 4  Verbal;Demo  -CP      Reps 4  5  -CP      Time 4  bilateral  -CP         Exercise 5    Exercise Name 5  step downs 6\"  -CP      Cueing 5  Verbal;Demo  -CP      Sets 5  2  -CP      Reps 5  10  -CP         Exercise 6    Exercise Name 6  step up and over BOSU  -CP      Cueing 6  Verbal;Demo  -CP      Reps 6  20  -CP         Exercise 7    Exercise Name 7  rebounder on upside down BOSU  -CP      Cueing 7  Verbal;Demo  -CP      Reps 7  20  -CP      Time 7  2 lb ball  -CP         Exercise 8    Exercise Name 8  heel walk/toe walk  -CP      Cueing 8  Verbal  -CP      Reps 8  20 ft each  -CP         Exercise 9    Exercise Name 9  quick feet  -CP      Cueing 9  Verbal  -CP      Reps 9  30 ft  -CP         Exercise 10    Exercise Name 10  jogging in gym  -CP         Exercise 11    Exercise Name 11  defensive slides with touch  -CP      Cueing 11  Verbal;Demo  -CP      Reps 11  1/2 court and back  -CP      Additional Comments  2 reps  -CP         Exercise 12    Exercise Name 12  braiding quicker   -CP      Cueing 12  Verbal;Demo  -CP      Reps 12  2  -CP      Time 12  1/2 court and back  -CP         Exercise 13    Exercise Name 13  jogging with pivor  -CP      Cueing 13  Verbal;Demo  -CP      Sets 13  1/2 court corners  -CP      Reps 13  2  -CP         Exercise 14    Exercise Name 14  ankle t-band 4 way  -CP      Cueing 14  Verbal;Tactile  -CP      Sets 14  1  -CP      Reps 14  5  -CP      Time 14  blue tband  -CP         Exercise 15    Exercise Name 15  wall plank with CR  -CP      Cueing 15  Verbal;Demo  -CP      Sets 15  1  -CP      Reps 15  10  -CP        User Key  (r) = Recorded By, " (t) = Taken By, (c) = Cosigned By    Initials Name Provider Type    CP Dori Bo, MONICO Physical Therapy Assistant                       PT OP Goals     Row Name 07/23/20 0900          PT Short Term Goals    STG Date to Achieve  07/16/20  -CP     STG 1  Pt independent with HEP  -CP     STG 1 Progress  Ongoing  -CP     STG 2  Resume running on treadmill with no R ankle pain/symptoms  -CP     STG 2 Progress  Not Met  -CP     STG 3  Descend stairs with no R ankle pain  -CP     STG 3 Progress  Progressing;Ongoing  -CP     STG 4  Improve R ankle DF AROM to >/=6 degrees  -CP     STG 4 Progress  Met  -CP     STG 5  SLS x 30 sec on R LE with good ankle stability  -CP     STG 5 Progress  Progressing  -CP        Time Calculation    PT Goal Re-Cert Due Date  08/10/20  -CP       User Key  (r) = Recorded By, (t) = Taken By, (c) = Cosigned By    Initials Name Provider Type    CP Dori Bo, PTA Physical Therapy Assistant          Therapy Education  Education Details: tband ankle 4 way, plank with DL CR  Given: HEP  Program: New  How Provided: Verbal, Demonstration, Written  Provided to: Patient  Level of Understanding: Teach back education performed, Verbalized, Demonstrated              Time Calculation:   Start Time: 0845  Stop Time: 0930  Time Calculation (min): 45 min  Total Timed Code Minutes- PT: 45 minute(s)  Therapy Charges for Today     Code Description Service Date Service Provider Modifiers Qty    14124093117 HC PT THER PROC EA 15 MIN 7/23/2020 Dori Bo PTA GP 3                    Dori Bo PTA  7/23/2020

## 2020-07-27 ENCOUNTER — HOSPITAL ENCOUNTER (OUTPATIENT)
Dept: PHYSICAL THERAPY | Facility: HOSPITAL | Age: 30
Setting detail: THERAPIES SERIES
Discharge: HOME | End: 2020-07-27

## 2020-07-27 DIAGNOSIS — M25.571 RIGHT ANKLE PAIN, UNSPECIFIED CHRONICITY: Primary | ICD-10-CM

## 2020-07-27 DIAGNOSIS — S93.421D SPRAIN OF DELTOID LIGAMENT OF RIGHT ANKLE, SUBSEQUENT ENCOUNTER: ICD-10-CM

## 2020-07-27 PROCEDURE — 97110 THERAPEUTIC EXERCISES: CPT

## 2020-07-27 NOTE — THERAPY TREATMENT NOTE
Outpatient Physical Therapy Ortho Treatment Note  [facility]     Patient Name: Barak Velasquez  : 1990  MRN: [id number]  Today's Date: 2020      Visit Date: 2020     Subjective Improvement seeing improvement  Visits 5/6  Visits approved 5 additional visits have been requested  RTMD 2020  Recert Date 8-    Right ankle sprain with avulsion FX on 2020    Visit Dx:    ICD-10-CM ICD-9-CM   1. Right ankle pain, unspecified chronicity M25.571 719.47   2. Sprain of deltoid ligament of right ankle, subsequent encounter S93.421D V58.89     845.01       Patient Active Problem List   Diagnosis   • Right ankle pain   • Sprain of deltoid ligament of right ankle        Past Medical History:   Diagnosis Date   • Asthma         Past Surgical History:   Procedure Laterality Date   • KNEE ACL RECONSTRUCTION Left                        PT Assessment/Plan     Row Name 20 0929          PT Assessment    Assessment Comments  Very good tolerance to ther ex.  Patient did have some decrease balance with clocks.  However, no reports of increase pain just a general soreness during clocks.  -CP        PT Plan    PT Frequency  1x/week;2x/week  -CP     Predicted Duration of Therapy Intervention (Therapy Eval)  10 visits total  -CP     PT Plan Comments  Additional visits have been requested.  If approved, schedule for 5 additionals visit then D/C to home with HEP  -CP       User Key  (r) = Recorded By, (t) = Taken By, (c) = Cosigned By    Initials Name Provider Type    Dori Shafer PTA Physical Therapy Assistant            OP Exercises     Row Name 20 0900             Subjective Comments    Subjective Comments  Currently no pain.  However over the weekend, he did hae pin 4/10 while doing yardwork  -CP         Subjective Pain    Able to rate subjective pain?  yes  -CP      Pre-Treatment Pain Level  0  -CP      Post-Treatment Pain Level  0  -CP         Exercise 1    Exercise Name  1  Pro II level 5  -CP         Exercise 2    Exercise Name 2  incline stretch  -CP      Cueing 2  Verbal  -CP      Sets 2  3  -CP      Time 2  30  -CP         Exercise 3    Exercise Name 3  solous stretch  -CP      Cueing 3  Verbal  -CP      Sets 3  3  -CP      Time 3  30  -CP         Exercise 4    Exercise Name 4  up and down ramp side stepping  -CP      Cueing 4  Verbal  -CP      Reps 4  5  -CP      Time 4  bilateral  -CP         Exercise 5    Exercise Name 5  BOSU sep up  -CP      Cueing 5  Verbal  -CP      Sets 5  1  -CP      Reps 5  15  -CP         Exercise 6    Exercise Name 6  BOSU lat step up  -CP      Cueing 6  Verbal  -CP      Sets 6  1  -CP      Reps 6  15  -CP         Exercise 7    Exercise Name 7  Step up and over BOSU  -CP      Cueing 7  Verbal  -CP      Sets 7  1  -CP      Reps 7  15  -CP         Exercise 8    Exercise Name 8  resisted walks on CC  -CP      Cueing 8  Verbal  -CP      Reps 8  5  -CP      Time 8  6 plates  -CP         Exercise 9    Exercise Name 9  clocks   -CP      Cueing 9  Verbal;Demo  -CP      Sets 9  1  -CP      Reps 9  5  -CP        User Key  (r) = Recorded By, (t) = Taken By, (c) = Cosigned By    Initials Name Provider Type    CP Dori Bo, PTA Physical Therapy Assistant                       PT OP Goals     Row Name 07/27/20 0900          PT Short Term Goals    STG Date to Achieve  07/16/20  -CP     STG 1  Pt independent with HEP  -CP     STG 1 Progress  Ongoing  -CP     STG 2  Resume running on treadmill with no R ankle pain/symptoms  -CP     STG 2 Progress  Not Met  -CP     STG 3  Descend stairs with no R ankle pain  -CP     STG 3 Progress  Progressing;Ongoing  -CP     STG 4  Improve R ankle DF AROM to >/=6 degrees  -CP     STG 4 Progress  Met  -CP     STG 5  SLS x 30 sec on R LE with good ankle stability  -CP     STG 5 Progress  Progressing  -CP        Time Calculation    PT Goal Re-Cert Due Date  08/10/20  -CP       User Key  (r) = Recorded By, (t) = Taken By, (c) =  Cosigned By    Initials Name Provider Type    CP Dori Bo, MONICO Physical Therapy Assistant          Therapy Education  Education Details: clocks  Given: HEP  Program: New  How Provided: Verbal, Demonstration  Provided to: Patient  Level of Understanding: Teach back education performed, Verbalized, Demonstrated              Time Calculation:   Start Time: 0845  Stop Time: 0925  Time Calculation (min): 40 min  Total Timed Code Minutes- PT: 40 minute(s)  Therapy Charges for Today     Code Description Service Date Service Provider Modifiers Qty    85951082910 HC PT THER PROC EA 15 MIN 7/27/2020 Dori Bo PTA GP 3                    Dori Bo PTA  7/27/2020

## 2020-07-30 ENCOUNTER — APPOINTMENT (OUTPATIENT)
Dept: PHYSICAL THERAPY | Facility: HOSPITAL | Age: 30
End: 2020-07-30

## 2020-08-03 ENCOUNTER — HOSPITAL ENCOUNTER (OUTPATIENT)
Dept: PHYSICAL THERAPY | Facility: HOSPITAL | Age: 30
Setting detail: THERAPIES SERIES
Discharge: HOME | End: 2020-08-03

## 2020-08-03 DIAGNOSIS — S93.421D SPRAIN OF DELTOID LIGAMENT OF RIGHT ANKLE, SUBSEQUENT ENCOUNTER: ICD-10-CM

## 2020-08-03 DIAGNOSIS — M25.571 RIGHT ANKLE PAIN, UNSPECIFIED CHRONICITY: Primary | ICD-10-CM

## 2020-08-03 PROCEDURE — 97110 THERAPEUTIC EXERCISES: CPT

## 2020-08-03 NOTE — THERAPY TREATMENT NOTE
Outpatient Physical Therapy Ortho Treatment Note  [facility]     Patient Name: Barak Velasquez  : 1990  MRN: [id number]  Today's Date: 8/3/2020      Visit Date: 2020     Subjective Improvement getting better  Visits 6/8  Visits approved 10  RTMD 2020  Recert Date 8-    Visit Dx:    ICD-10-CM ICD-9-CM   1. Right ankle pain, unspecified chronicity M25.571 719.47   2. Sprain of deltoid ligament of right ankle, subsequent encounter S93.421D V58.89     845.01       Patient Active Problem List   Diagnosis   • Right ankle pain   • Sprain of deltoid ligament of right ankle        Past Medical History:   Diagnosis Date   • Asthma         Past Surgical History:   Procedure Laterality Date   • KNEE ACL RECONSTRUCTION Left                        PT Assessment/Plan     Row Name 20 1110          PT Assessment    Assessment Comments  TTP toright ant/lat ankle.  -CP        PT Plan    PT Frequency  1x/week;2x/week  -CP     Predicted Duration of Therapy Intervention (Therapy Eval)  2 weeks 10 visits total  -CP     PT Plan Comments  cont with POC. Monitor response to manual and US  -CP       User Key  (r) = Recorded By, (t) = Taken By, (c) = Cosigned By    Initials Name Provider Type    Dori Shafer PTA Physical Therapy Assistant          Modalities     Row Name 20 1000             Ultrasound 71936    Location  right ant ankle  -CP      Duty Cycle  100  -CP      Frequency  3.0 MHz  -CP      Intensity - Wts/cm  1.5  -CP      69723 - PT Ultrasound Minutes  5  -CP        User Key  (r) = Recorded By, (t) = Taken By, (c) = Cosigned By    Initials Name Provider Type    Dori Shafer PTA Physical Therapy Assistant        OP Exercises     Row Name 20 1000             Subjective Comments    Subjective Comments  Patient states that he was squatting  over the weekend.  Afterward, he noticed some swelling in the right ankle.  -CP         Subjective Pain    Able to rate  subjective pain?  yes  -CP      Pre-Treatment Pain Level  2  -CP      Post-Treatment Pain Level  1  -CP         Exercise 1    Exercise Name 1  epilicaal  -CP      Time 1  8  -CP         Exercise 2    Exercise Name 2  incline stretch  -CP      Cueing 2  Verbal  -CP      Sets 2  3  -CP      Time 2  30  -CP         Exercise 3    Exercise Name 3  solous stretch  -CP      Cueing 3  Verbal  -CP      Sets 3  3  -CP      Time 3  30  -CP         Exercise 4    Exercise Name 4  deep squats  -CP      Cueing 4  Verbal;Demo  -CP      Reps 4  20  -CP         Exercise 5    Exercise Name 5  wall planks with right CR and Left hip fl  -CP      Cueing 5  Verbal;Demo  -CP      Sets 5  2  -CP      Reps 5  10  -CP         Exercise 6    Exercise Name 6  see manual  -CP         Exercise 7    Exercise Name 7  see modalities  -CP         Exercise 8    Exercise Name 8  rebounder shipshaper  -CP      Cueing 8  Verbal;Demo  -CP      Sets 8  1  -CP      Reps 8  10 each  -CP      Time 8  squat, split satance  -CP        User Key  (r) = Recorded By, (t) = Taken By, (c) = Cosigned By    Initials Name Provider Type    CP Dori Bo PTA Physical Therapy Assistant                      Manual Rx (last 36 hours)      Manual Treatments     Row Name 08/03/20 1100             Manual Rx 1    Manual Rx 1 Location  croos friction and MFR  -CP      Manual Rx 1 Type  right ant/lat ankle  -CP      Manual Rx 1 Duration  8  -CP        User Key  (r) = Recorded By, (t) = Taken By, (c) = Cosigned By    Initials Name Provider Type    CP Dori Bo PTA Physical Therapy Assistant          PT OP Goals     Row Name 08/03/20 1100          PT Short Term Goals    STG Date to Achieve  07/16/20  -CP     STG 1  Pt independent with HEP  -CP     STG 1 Progress  Ongoing  -CP     STG 2  Resume running on treadmill with no R ankle pain/symptoms  -CP     STG 2 Progress  Not Met  -CP     STG 3  Descend stairs with no R ankle pain  -CP     STG 3 Progress   Progressing;Ongoing  -CP     STG 4  Improve R ankle DF AROM to >/=6 degrees  -CP     STG 4 Progress  Met  -CP     STG 5  SLS x 30 sec on R LE with good ankle stability  -CP     STG 5 Progress  Progressing  -CP        Time Calculation    PT Goal Re-Cert Due Date  08/10/20  -CP       User Key  (r) = Recorded By, (t) = Taken By, (c) = Cosigned By    Initials Name Provider Type    CP Dori Bo, MONICO Physical Therapy Assistant                         Time Calculation:   Start Time: 0933  Stop Time: 1013  Time Calculation (min): 40 min  Total Timed Code Minutes- PT: 40 minute(s)  Therapy Charges for Today     Code Description Service Date Service Provider Modifiers Qty    15781241027 HC PT THER PROC EA 15 MIN 8/3/2020 Dori Bo PTA GP 3                    Dori Bo PTA  8/3/2020

## 2020-08-06 ENCOUNTER — APPOINTMENT (OUTPATIENT)
Dept: PHYSICAL THERAPY | Facility: HOSPITAL | Age: 30
End: 2020-08-06

## 2020-08-10 ENCOUNTER — HOSPITAL ENCOUNTER (OUTPATIENT)
Dept: PHYSICAL THERAPY | Facility: HOSPITAL | Age: 30
Setting detail: THERAPIES SERIES
Discharge: HOME | End: 2020-08-10

## 2020-08-10 DIAGNOSIS — S93.421D SPRAIN OF DELTOID LIGAMENT OF RIGHT ANKLE, SUBSEQUENT ENCOUNTER: ICD-10-CM

## 2020-08-10 DIAGNOSIS — M25.571 RIGHT ANKLE PAIN, UNSPECIFIED CHRONICITY: Primary | ICD-10-CM

## 2020-08-10 PROCEDURE — 97110 THERAPEUTIC EXERCISES: CPT | Performed by: PHYSICAL THERAPIST

## 2020-08-10 PROCEDURE — 97112 NEUROMUSCULAR REEDUCATION: CPT | Performed by: PHYSICAL THERAPIST

## 2020-08-10 NOTE — THERAPY TREATMENT NOTE
Outpatient Physical Therapy Ortho Treatment Note  [facility]     Patient Name: Barak Velasquez  : 1990  MRN: [id number]  Today's Date: 8/10/2020      Visit Date: 08/10/2020  Subjective Improvement getting better  Visits 6/8  Visits approved 10  RTMD 2020  Recert Date 8-    Visit Dx:    ICD-10-CM ICD-9-CM   1. Right ankle pain, unspecified chronicity M25.571 719.47   2. Sprain of deltoid ligament of right ankle, subsequent encounter S93.421D V58.89     845.01       Patient Active Problem List   Diagnosis   • Right ankle pain   • Sprain of deltoid ligament of right ankle        Past Medical History:   Diagnosis Date   • Asthma         Past Surgical History:   Procedure Laterality Date   • KNEE ACL RECONSTRUCTION Left        PT Ortho     Row Name 08/10/20 0850       Subjective Comments    Subjective Comments  pt reports rode the elliptical and did well last week.   -BS       Precautions and Contraindications    Precautions/Limitations  no known precautions/limitations  -BS    Precautions  R ankle avulsion fx on 2020  -BS       Subjective Pain    Able to rate subjective pain?  yes  -BS    Pre-Treatment Pain Level  1  -BS    Post-Treatment Pain Level  1  -BS      User Key  (r) = Recorded By, (t) = Taken By, (c) = Cosigned By    Initials Name Provider Type    Nikita Alexis, PT Physical Therapist                      PT Assessment/Plan     Row Name 08/10/20 0850          PT Assessment    Assessment Comments  good tolerance to running on [employer] [place] for the first time. Able to climb and descend stairs (17 total) with no increase in R ankle pain.   -BS        PT Plan    PT Frequency  1x/week;2x/week  -BS     Predicted Duration of Therapy Intervention (Therapy Eval)  2 weeks (3 visits remaining)(  -BS     PT Plan Comments  re-cert next session.   -BS       User Key  (r) = Recorded By, (t) = Taken By, (c) = Cosigned By    Initials Name Provider Type    Nikita Alexis, PT Physical  "Therapist            OP Exercises     Row Name 08/10/20 0850             Subjective Comments    Subjective Comments  pt reports rode the elliptical and did well last week.   -BS         Subjective Pain    Able to rate subjective pain?  yes  -BS      Pre-Treatment Pain Level  1  -BS      Post-Treatment Pain Level  1  -BS         Exercise 1    Exercise Name 1  Pro II level 5  -BS      Time 1  10  -BS         Exercise 2    Exercise Name 2  incline stretch  -BS      Sets 2  3  -BS      Time 2  30  -BS         Exercise 3    Exercise Name 3  soleus stretch  -BS      Sets 3  3  -BS      Time 3  30  -BS         Exercise 4    Exercise Name 4  SLS on level ground  -BS      Reps 4  20\" max  -BS         Exercise 5    Exercise Name 5  SLS on Airex  -BS      Reps 5  10-15\"  -BS         Exercise 6    Exercise Name 6  SLS on level ground w/ 4# ball toss against rebounder  -BS      Time 6  3'  -BS         Exercise 7    Exercise Name 7  full tandem stading on foam balance beam with ball toss against rebounder, 4# ball  -BS      Time 7  2'  -BS         Exercise 8    Exercise Name 8  step up/over BOSU  -BS      Time 8  20x's  -BS         Exercise 9    Exercise Name 9  unilateral heel raises, R  -BS      Sets 9  2  -BS      Reps 9  10  -BS         Exercise 10    Exercise Name 10  climbing up/down 17 stairs with no R ankle pain, reciprocal gait  -BS      Time 10  1 rep  -BS         Exercise 11    Exercise Name 11  running on Treadmilll   -BS      Time 11  4'  -BS      Additional Comments  slight increase in R ankle pain  -BS        User Key  (r) = Recorded By, (t) = Taken By, (c) = Cosigned By    Initials Name Provider Type    BS Nikita Desai, PT Physical Therapist                       PT OP Goals     Row Name 08/10/20 0800          Time Calculation    PT Goal Re-Cert Due Date  08/10/20  -BS       User Key  (r) = Recorded By, (t) = Taken By, (c) = Cosigned By    Initials Name Provider Type    BS Nkiita Desai, PT Physical Therapist "          Therapy Education  Education Details: ball toss against rebounder in full tandem on foam balance beam as well as on Airex SLS on R LE   Given: Mobility training, HEP  Program: New  How Provided: Verbal, Demonstration  Provided to: Patient  Level of Understanding: Teach back education performed              Time Calculation:   Start Time: 0850  Stop Time: 0930  Time Calculation (min): 40 min  Total Timed Code Minutes- PT: 40 minute(s)  Therapy Charges for Today     Code Description Service Date Service Provider Modifiers Qty    74056786615  PT THER PROC EA 15 MIN 8/10/2020 Nikita Desai, PT GP 2    [id number]  PT NEUROMUSC RE EDUCATION EA 15 MIN 8/10/2020 Nikita Desai, PT GP 1                    Nikita Desai, PT  8/10/2020

## 2020-08-12 ENCOUNTER — HOSPITAL ENCOUNTER (OUTPATIENT)
Dept: PHYSICAL THERAPY | Facility: HOSPITAL | Age: 30
Setting detail: THERAPIES SERIES
Discharge: HOME | End: 2020-08-12

## 2020-08-12 DIAGNOSIS — S93.421D SPRAIN OF DELTOID LIGAMENT OF RIGHT ANKLE, SUBSEQUENT ENCOUNTER: ICD-10-CM

## 2020-08-12 DIAGNOSIS — M25.571 RIGHT ANKLE PAIN, UNSPECIFIED CHRONICITY: Primary | ICD-10-CM

## 2020-08-12 PROCEDURE — 97110 THERAPEUTIC EXERCISES: CPT | Performed by: PHYSICAL THERAPIST

## 2020-08-12 PROCEDURE — 97112 NEUROMUSCULAR REEDUCATION: CPT | Performed by: PHYSICAL THERAPIST

## 2020-08-12 NOTE — THERAPY PROGRESS REPORT/RE-CERT
Outpatient Physical Therapy Ortho Progress Note  [facility]     Patient Name: Barak Velasquez  : 1990  MRN: [id number]  Today's Date: 2020      Visit Date: 2020  Subjective Improvement 70%  Visits 8/10 (2 left)  Visits approved 10  RTMD TBD  Recert Date N/A    Visit Dx:    ICD-10-CM ICD-9-CM   1. Right ankle pain, unspecified chronicity M25.571 719.47   2. Sprain of deltoid ligament of right ankle, subsequent encounter S93.421D V58.89     845.01       Patient Active Problem List   Diagnosis   • Right ankle pain   • Sprain of deltoid ligament of right ankle        Past Medical History:   Diagnosis Date   • Asthma         Past Surgical History:   Procedure Laterality Date   • KNEE ACL RECONSTRUCTION Left        PT Ortho     Row Name 20       Subjective Comments    Subjective Comments  70% improvement; pt with no pain descending steps this morning, only has pain when ankle is in stressed position with running/jogging.  -BS       Precautions and Contraindications    Precautions/Limitations  no known precautions/limitations  -BS    Precautions  R ankle avulsion fx on 2020  -BS       Subjective Pain    Able to rate subjective pain?  yes  -BS    Pre-Treatment Pain Level  0  -BS    Post-Treatment Pain Level  0  -BS       General ROM    GENERAL ROM COMMENTS  AROM: R ankle DF 8 deg  -BS       MMT (Manual Muscle Testing)    General MMT Comments  R ankle aileen 4/5 inv 5/5 DF 5/5 PF 5/5   -BS      User Key  (r) = Recorded By, (t) = Taken By, (c) = Cosigned By    Initials Name Provider Type    Nikita Alexis, PT Physical Therapist                      PT Assessment/Plan     Row Name 20 0927          PT Assessment    Assessment Comments  Progressing overall with stair climbing and running with less R ankle pain. Still limited by ankle instability, especially with lack of eversion strength/stabilization with SLS type tasks. R foot/ankle compensates by inverting excessively  "with SLS tasks on unstable Airex pad with ball toss and Bodyblade type tasks.   -BS        PT Plan    PT Frequency  1x/week;2x/week  -BS     Predicted Duration of Therapy Intervention (Therapy Eval)  2 visits left  -BS     PT Plan Comments  address R ankle eversion strength. Eccentric heel raises going into inversion, lateral step downs. Agility for speed-side stepping.   -BS       User Key  (r) = Recorded By, (t) = Taken By, (c) = Cosigned By    Initials Name Provider Type    BS Nikita Desai, PT Physical Therapist            OP Exercises     Row Name 08/12/20 0927             Subjective Comments    Subjective Comments  70% improvement; pt with no pain descending steps this morning, only has pain when ankle is in stressed position with running/jogging.  -BS         Subjective Pain    Able to rate subjective pain?  yes  -BS      Pre-Treatment Pain Level  0  -BS      Post-Treatment Pain Level  0  -BS         Exercise 1    Exercise Name 1  Treadmill running  -BS      Time 1  7'  -BS         Exercise 2    Exercise Name 2  incline stretch  -BS      Sets 2  3  -BS      Time 2  30  -BS         Exercise 3    Exercise Name 3  soleus stretch  -BS      Sets 3  3  -BS      Time 3  30  -BS         Exercise 4    Exercise Name 4  SLS on Airex w/ Bodyblade  -BS      Time 4  4'  -BS         Exercise 5    Exercise Name 5  SLS on Airex with ball toss against rebounder  -BS      Time 5  4'  -BS      Additional Comments  4# or 6# ball  -BS         Exercise 6    Exercise Name 6  unilateral heel raises, R  -BS      Sets 6  2  -BS      Reps 6  10  -BS         Exercise 7    Exercise Name 7  clocks-12-10-6 o'clock-SLS w/ reaching L LE   -BS      Time 7  4'  -BS         Exercise 8    Exercise Name 8  seated blue TB resisted R ankle ERNESTO  -BS      Sets 8  2  -BS      Reps 8  20  -BS         Exercise 9    Exercise Name 9  eccentric step downs, 6\"  -BS      Sets 9  1  -BS      Reps 9  15  -BS        User Key  (r) = Recorded By, (t) = Taken By, " (c) = Cosigned By    Initials Name Provider Type    Nikita Alexis, PT Physical Therapist                       PT OP Goals     Row Name 08/12/20 0927          PT Short Term Goals    STG Date to Achieve  08/28/20  -BS     STG 1  Pt independent with HEP  -BS     STG 1 Progress  Ongoing  -BS     STG 2  Resume running on treadmill with no R ankle pain/symptoms  -BS     STG 2 Progress  Progressing  -BS     STG 3  Descend stairs with no R ankle pain  -BS     STG 3 Progress  Met  -BS     STG 4  Improve R ankle DF AROM to >/=6 degrees  -BS     STG 4 Progress  Met  -BS     STG 5  SLS x 30 sec on R LE with good ankle stability  -BS     STG 5 Progress  Progressing  -BS     STG 6  Improve R ankle ERNESTO MMT to 5/5  -BS     STG 6 Progress  New  -BS        Time Calculation    PT Goal Re-Cert Due Date  -- N/A  -BS       User Key  (r) = Recorded By, (t) = Taken By, (c) = Cosigned By    Initials Name Provider Type    Nikita Alexis, PT Physical Therapist          Therapy Education  Education Details: see flow sheet  Given: HEP, Mobility training  Program: New, Reinforced  How Provided: Verbal, Demonstration  Provided to: Patient  Level of Understanding: Teach back education performed    Outcome Measure Options: Lower Extremity Functional Scale (LEFS)  Lower Extremity Functional Index  Any of your usual work, housework or school activities: A little bit of difficulty  Your usual hobbies, recreational or sporting activities: A little bit of difficulty  Getting into or out of the bath: No difficulty  Walking between rooms: No difficulty  Putting on your shoes or socks: No difficulty  Squatting: A little bit of difficulty  Lifting an object, like a bag of groceries from the floor: No difficulty  Performing light activities around your home: No difficulty  Performing heavy activities around your home: A little bit of difficulty  Getting into or out of a car: No difficulty  Walking 2 blocks: No difficulty  Walking a mile: No  difficulty  Going up or down 10 stairs (about 1 flight of stairs): No difficulty  Standing for 1 hour: No difficulty  Sitting for 1 hour: No difficulty  Running on even ground: A little bit of difficulty  Running on uneven ground: A little bit of difficulty  Making sharp turns while running fast: A little bit of difficulty  Hopping: A little bit of difficulty  Rolling over in bed: No difficulty  Total: 72      Time Calculation:   Start Time: 0927  Stop Time: 1012  Time Calculation (min): 45 min  Total Timed Code Minutes- PT: 45 minute(s)  Therapy Charges for Today     Code Description Service Date Service Provider Modifiers Qty    24469108621  PT THER PROC EA 15 MIN 8/12/2020 Nikita Desai, PT GP 1    [id number] HC PT NEUROMUSC RE EDUCATION EA 15 MIN 8/12/2020 Nikita Desai, PT GP 2          PT G-Codes  Outcome Measure Options: Lower Extremity Functional Scale (LEFS)  Total: 72         Nikita Desai PT  8/12/2020

## 2020-08-20 ENCOUNTER — APPOINTMENT (OUTPATIENT)
Dept: PHYSICAL THERAPY | Facility: HOSPITAL | Age: 30
End: 2020-08-20

## 2020-08-25 ENCOUNTER — OFFICE VISIT (OUTPATIENT)
Dept: ORTHOPEDIC SURGERY | Facility: CLINIC | Age: 30
End: 2020-08-25

## 2020-08-25 VITALS — HEIGHT: 70 IN | WEIGHT: 213 LBS | BODY MASS INDEX: 30.49 KG/M2

## 2020-08-25 DIAGNOSIS — S93.421D SPRAIN OF DELTOID LIGAMENT OF RIGHT ANKLE, SUBSEQUENT ENCOUNTER: ICD-10-CM

## 2020-08-25 DIAGNOSIS — M25.571 RIGHT ANKLE PAIN, UNSPECIFIED CHRONICITY: Primary | ICD-10-CM
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
## 2020-08-25 DIAGNOSIS — S93.421A SPRAIN OF DELTOID LIGAMENT OF RIGHT ANKLE, INITIAL ENCOUNTER: ICD-10-CM
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
## 2020-08-25 PROCEDURE — 99213 OFFICE O/P EST LOW 20 MIN: CPT | Performed by: ORTHOPAEDIC SURGERY

## 2020-08-25 NOTE — PROGRESS NOTES
"Barak Velasquez is a 30 y.o. male returns for     Chief Complaint   Patient presents with   • Right Ankle - Follow-up, Pain       HISTORY OF PRESENT ILLNESS:  Patient in for follow up of right ankle pain.   Pain- 0/10,  Comes and goes through out the day and depending on the position  Overall he is doing better.  He still has some stiffness and soreness with certain activities.  His work boot maintain stability with most activity.  He uses ankle braces occasionally as needed.  He has continued working with physical therapy.     CONCURRENT MEDICAL HISTORY:    The following portions of the patient's history were reviewed and updated as appropriate: allergies, current medications, past family history, past medical history, past social history, past surgical history and problem list.     ROS  No fevers or chills.  No chest pain or shortness of air.  No GI or  disturbances.    PHYSICAL EXAMINATION:       Ht 177.8 cm (70\")   Wt 96.6 kg (213 lb)   BMI 30.56 kg/m²     Physical Exam   Constitutional: He is oriented to person, place, and time. He appears well-developed and well-nourished.   Neurological: He is alert and oriented to person, place, and time.   Psychiatric: He has a normal mood and affect. His behavior is normal. Judgment and thought content normal.       GAIT:     []  Normal  []  Antalgic    Assistive device: [x]  None  []  Walker     []  Crutches  []  Cane     []  Wheelchair  []  Stretcher    Right Ankle Exam     Tenderness   Right ankle tenderness location: Mild tenderness over the ATFL.  Swelling: none    Range of Motion   The patient has normal right ankle ROM.    Muscle Strength   The patient has normal right ankle strength.    Tests   Anterior drawer: negative    Other   Erythema: absent  Sensation: normal  Pulse: present                         ASSESSMENT:    Diagnoses and all orders for this visit:    Right ankle pain, unspecified chronicity    Sprain of deltoid ligament of right ankle, " subsequent encounter    Sprain of deltoid ligament of right ankle, initial encounter    Fall at home, initial encounter          PLAN    Continue with home exercise program.  Continue with bracing or boot wear for support.  Strength and conditioning as tolerated.  Follow-up on an as-needed basis.    Patient's Body mass index is 30.56 kg/m². BMI is above normal parameters. Recommendations include: exercise counseling and nutrition counseling.    Return if symptoms worsen or fail to improve, for recheck.    Charles Brewer MD

## 2020-11-12 ENCOUNTER — DOCUMENTATION (OUTPATIENT)
Dept: PHYSICAL THERAPY | Facility: HOSPITAL | Age: 30
End: 2020-11-12

## 2020-11-12 DIAGNOSIS — S93.421D SPRAIN OF DELTOID LIGAMENT OF RIGHT ANKLE, SUBSEQUENT ENCOUNTER: ICD-10-CM

## 2020-11-12 DIAGNOSIS — M25.571 RIGHT ANKLE PAIN, UNSPECIFIED CHRONICITY: Primary | ICD-10-CM

## 2020-12-29 ENCOUNTER — IMMUNIZATION (OUTPATIENT)
Dept: VACCINE CLINIC | Facility: HOSPITAL | Age: 30
End: 2020-12-29

## 2020-12-29 PROCEDURE — 0001A: CPT | Performed by: THORACIC SURGERY (CARDIOTHORACIC VASCULAR SURGERY)

## 2020-12-29 PROCEDURE — 91300 HC SARSCOV02 VAC 30MCG/0.3ML IM: CPT | Performed by: THORACIC SURGERY (CARDIOTHORACIC VASCULAR SURGERY)

## 2021-01-19 ENCOUNTER — IMMUNIZATION (OUTPATIENT)
Dept: VACCINE CLINIC | Facility: HOSPITAL | Age: 31
End: 2021-01-19

## 2021-01-19 PROCEDURE — 0002A: CPT | Performed by: THORACIC SURGERY (CARDIOTHORACIC VASCULAR SURGERY)

## 2021-01-19 PROCEDURE — 91300 HC SARSCOV02 VAC 30MCG/0.3ML IM: CPT | Performed by: THORACIC SURGERY (CARDIOTHORACIC VASCULAR SURGERY)
